# Patient Record
Sex: MALE | URBAN - METROPOLITAN AREA
[De-identification: names, ages, dates, MRNs, and addresses within clinical notes are randomized per-mention and may not be internally consistent; named-entity substitution may affect disease eponyms.]

---

## 2018-08-23 ENCOUNTER — APPOINTMENT (OUTPATIENT)
Dept: LAB | Age: 23
End: 2018-08-23
Attending: NURSE PRACTITIONER
Payer: COMMERCIAL

## 2018-08-23 ENCOUNTER — OFFICE VISIT (OUTPATIENT)
Dept: FAMILY MEDICINE CLINIC | Facility: CLINIC | Age: 23
End: 2018-08-23
Payer: COMMERCIAL

## 2018-08-23 VITALS
OXYGEN SATURATION: 98 % | HEART RATE: 92 BPM | RESPIRATION RATE: 20 BRPM | BODY MASS INDEX: 37.57 KG/M2 | TEMPERATURE: 99 F | HEIGHT: 72 IN | DIASTOLIC BLOOD PRESSURE: 80 MMHG | SYSTOLIC BLOOD PRESSURE: 138 MMHG | WEIGHT: 277.38 LBS

## 2018-08-23 DIAGNOSIS — K29.00 ACUTE GASTRITIS, PRESENCE OF BLEEDING UNSPECIFIED, UNSPECIFIED GASTRITIS TYPE: Primary | ICD-10-CM

## 2018-08-23 DIAGNOSIS — F41.9 ANXIETY: ICD-10-CM

## 2018-08-23 PROCEDURE — 83013 H PYLORI (C-13) BREATH: CPT | Performed by: NURSE PRACTITIONER

## 2018-08-23 PROCEDURE — 99215 OFFICE O/P EST HI 40 MIN: CPT | Performed by: NURSE PRACTITIONER

## 2018-08-23 RX ORDER — ALBUTEROL SULFATE 90 UG/1
1-2 AEROSOL, METERED RESPIRATORY (INHALATION)
COMMUNITY
Start: 2016-07-05 | End: 2019-05-23

## 2018-08-23 RX ORDER — SERTRALINE HYDROCHLORIDE 100 MG/1
100 TABLET, FILM COATED ORAL DAILY
Qty: 30 TABLET | Refills: 3 | Status: SHIPPED | OUTPATIENT
Start: 2018-08-23 | End: 2019-05-23

## 2018-08-23 RX ORDER — LORAZEPAM 0.5 MG/1
0.5 TABLET ORAL EVERY 6 HOURS PRN
Refills: 0 | COMMUNITY
Start: 2018-07-25 | End: 2020-01-16

## 2018-08-23 RX ORDER — SERTRALINE HYDROCHLORIDE 25 MG/1
25 TABLET, FILM COATED ORAL DAILY
Qty: 30 TABLET | Refills: 3 | Status: SHIPPED | OUTPATIENT
Start: 2018-08-23 | End: 2019-05-23

## 2018-08-23 NOTE — PROGRESS NOTES
Mariana Velasquez is a 26year old male. Patient presents with:  Back Pain: upper back  Shoulder Pain: left side  Heartburn      HPI:   Complaints of pain to left trapezius area - for a while.    Heart burn has gotten worse- used to just be with certain food on file. Social History:  Smoking status: Never Smoker                                                              Smokeless tobacco: Never Used                      Alcohol use: Yes              Comment: 2 per week- beer    No family history on file. diagnosis)  Anxiety      Orders Placed This Encounter      H PYLORI BREATH TEST [19668][Q]    Meds & Refills for this Visit:    Signed Prescriptions Disp Refills    Sertraline HCl 100 MG Oral Tab 30 tablet 3      Sig: Take 1 tablet (100 mg total) by mouth

## 2018-08-25 ENCOUNTER — TELEPHONE (OUTPATIENT)
Dept: FAMILY MEDICINE CLINIC | Facility: CLINIC | Age: 23
End: 2018-08-25

## 2018-08-25 LAB — H. PYLORI BREATH TEST: NEGATIVE

## 2018-08-25 NOTE — TELEPHONE ENCOUNTER
----- Message from AGUILA Lomeli sent at 8/25/2018  8:05 AM CDT -----  Please notify patient that his H. pylori test is negative. I would recommend that he take Nexium or Prilosec over-the-counter once a day for 2-4 weeks.   Also dietary changes as

## 2018-09-19 ENCOUNTER — HOSPITAL ENCOUNTER (EMERGENCY)
Facility: CLINIC | Age: 23
Discharge: HOME | End: 2018-09-20
Attending: EMERGENCY MEDICINE | Admitting: EMERGENCY MEDICINE
Payer: COMMERCIAL

## 2018-09-19 DIAGNOSIS — F41.9 ANXIETY: ICD-10-CM

## 2018-09-19 DIAGNOSIS — F12.90 MARIJUANA USE: ICD-10-CM

## 2018-09-19 LAB
ANION GAP SERPL CALCULATED.3IONS-SCNC: 12 MMOL/L (ref 3–14)
BASOPHILS # BLD AUTO: 0 10E9/L (ref 0–0.2)
BASOPHILS NFR BLD AUTO: 0.4 %
BUN SERPL-MCNC: 11 MG/DL (ref 7–30)
CA-I BLD-SCNC: 5.1 MG/DL (ref 4.4–5.2)
CALCIUM SERPL-MCNC: 9.6 MG/DL (ref 8.5–10.1)
CHLORIDE SERPL-SCNC: 101 MMOL/L (ref 94–109)
CO2 BLDCOV-SCNC: 23 MMOL/L (ref 21–28)
CO2 BLDCOV-SCNC: 23 MMOL/L (ref 21–28)
CO2 SERPL-SCNC: 22 MMOL/L (ref 20–32)
CREAT SERPL-MCNC: 1.04 MG/DL (ref 0.66–1.25)
DIFFERENTIAL METHOD BLD: NORMAL
EOSINOPHIL # BLD AUTO: 0.2 10E9/L (ref 0–0.7)
EOSINOPHIL NFR BLD AUTO: 1.6 %
ERYTHROCYTE [DISTWIDTH] IN BLOOD BY AUTOMATED COUNT: 13 % (ref 10–15)
GFR SERPL CREATININE-BSD FRML MDRD: 90 ML/MIN/1.7M2
GLUCOSE BLD-MCNC: 160 MG/DL (ref 70–99)
GLUCOSE SERPL-MCNC: 154 MG/DL (ref 70–99)
HCT VFR BLD AUTO: 43.3 % (ref 40–53)
HCT VFR BLD CALC: 43 %PCV (ref 40–53)
HGB BLD CALC-MCNC: 14.6 G/DL (ref 13.3–17.7)
HGB BLD-MCNC: 14.8 G/DL (ref 13.3–17.7)
IMM GRANULOCYTES # BLD: 0 10E9/L (ref 0–0.4)
IMM GRANULOCYTES NFR BLD: 0.2 %
LACTATE BLD-SCNC: 2.7 MMOL/L (ref 0.7–2)
LACTATE BLD-SCNC: 2.7 MMOL/L (ref 0.7–2.1)
LYMPHOCYTES # BLD AUTO: 3.9 10E9/L (ref 0.8–5.3)
LYMPHOCYTES NFR BLD AUTO: 40.3 %
MCH RBC QN AUTO: 29.4 PG (ref 26.5–33)
MCHC RBC AUTO-ENTMCNC: 34.2 G/DL (ref 31.5–36.5)
MCV RBC AUTO: 86 FL (ref 78–100)
MONOCYTES # BLD AUTO: 0.6 10E9/L (ref 0–1.3)
MONOCYTES NFR BLD AUTO: 6.2 %
NEUTROPHILS # BLD AUTO: 4.9 10E9/L (ref 1.6–8.3)
NEUTROPHILS NFR BLD AUTO: 51.3 %
NRBC # BLD AUTO: 0 10*3/UL
NRBC BLD AUTO-RTO: 0 /100
PCO2 BLDV: 33 MM HG (ref 40–50)
PCO2 BLDV: 34 MM HG (ref 40–50)
PH BLDV: 7.44 PH (ref 7.32–7.43)
PH BLDV: 7.45 PH (ref 7.32–7.43)
PLATELET # BLD AUTO: 288 10E9/L (ref 150–450)
PO2 BLDV: 34 MM HG (ref 25–47)
PO2 BLDV: 40 MM HG (ref 25–47)
POTASSIUM BLD-SCNC: 2.9 MMOL/L (ref 3.4–5.3)
POTASSIUM SERPL-SCNC: 2.9 MMOL/L (ref 3.4–5.3)
RBC # BLD AUTO: 5.04 10E12/L (ref 4.4–5.9)
SAO2 % BLDV FROM PO2: 69 %
SAO2 % BLDV FROM PO2: 78 %
SODIUM BLD-SCNC: 138 MMOL/L (ref 133–144)
SODIUM SERPL-SCNC: 136 MMOL/L (ref 133–144)
TROPONIN I SERPL-MCNC: <0.015 UG/L (ref 0–0.04)
WBC # BLD AUTO: 9.6 10E9/L (ref 4–11)

## 2018-09-19 PROCEDURE — 85025 COMPLETE CBC W/AUTO DIFF WBC: CPT | Performed by: EMERGENCY MEDICINE

## 2018-09-19 PROCEDURE — 40000502 ZZHCL STATISTIC GLUCOSE ED POCT

## 2018-09-19 PROCEDURE — 99285 EMERGENCY DEPT VISIT HI MDM: CPT

## 2018-09-19 PROCEDURE — 82330 ASSAY OF CALCIUM: CPT

## 2018-09-19 PROCEDURE — 93010 ELECTROCARDIOGRAM REPORT: CPT | Mod: Z6 | Performed by: EMERGENCY MEDICINE

## 2018-09-19 PROCEDURE — 83605 ASSAY OF LACTIC ACID: CPT | Mod: 91

## 2018-09-19 PROCEDURE — 93005 ELECTROCARDIOGRAM TRACING: CPT

## 2018-09-19 PROCEDURE — 83605 ASSAY OF LACTIC ACID: CPT | Performed by: EMERGENCY MEDICINE

## 2018-09-19 PROCEDURE — 40000497 ZZHCL STATISTIC SODIUM ED POCT

## 2018-09-19 PROCEDURE — 40000501 ZZHCL STATISTIC HEMATOCRIT ED POCT

## 2018-09-19 PROCEDURE — 99285 EMERGENCY DEPT VISIT HI MDM: CPT | Mod: 25 | Performed by: EMERGENCY MEDICINE

## 2018-09-19 PROCEDURE — 80048 BASIC METABOLIC PNL TOTAL CA: CPT | Performed by: EMERGENCY MEDICINE

## 2018-09-19 PROCEDURE — 25000132 ZZH RX MED GY IP 250 OP 250 PS 637: Performed by: EMERGENCY MEDICINE

## 2018-09-19 PROCEDURE — 84484 ASSAY OF TROPONIN QUANT: CPT | Performed by: EMERGENCY MEDICINE

## 2018-09-19 PROCEDURE — 96374 THER/PROPH/DIAG INJ IV PUSH: CPT

## 2018-09-19 PROCEDURE — 25000128 H RX IP 250 OP 636: Performed by: EMERGENCY MEDICINE

## 2018-09-19 PROCEDURE — 40000498 ZZHCL STATISTIC POTASSIUM ED POCT

## 2018-09-19 PROCEDURE — 82803 BLOOD GASES ANY COMBINATION: CPT

## 2018-09-19 RX ORDER — LORAZEPAM 2 MG/ML
1 INJECTION INTRAMUSCULAR ONCE
Status: COMPLETED | OUTPATIENT
Start: 2018-09-19 | End: 2018-09-20

## 2018-09-19 RX ORDER — POTASSIUM CHLORIDE 750 MG/1
40 TABLET, EXTENDED RELEASE ORAL ONCE
Status: COMPLETED | OUTPATIENT
Start: 2018-09-19 | End: 2018-09-19

## 2018-09-19 RX ADMIN — POTASSIUM CHLORIDE 40 MEQ: 750 TABLET, EXTENDED RELEASE ORAL at 23:47

## 2018-09-19 RX ADMIN — SODIUM CHLORIDE 1000 ML: 900 INJECTION, SOLUTION INTRAVENOUS at 23:47

## 2018-09-19 NOTE — ED AVS SNAPSHOT
[facility], [place], Emergency Department    [address] [place] [address]    Phone:  [phone]                                       Blaine Soto   MRN: [id number]    Department:  [facility], Emergency Department   Date of Visit:  9/19/2018           After Visit Summary Signature Page     I have received my discharge instructions, and my questions have been answered. I have discussed any challenges I see with this plan with the nurse or doctor.    ..........................................................................................................................................  Patient/Patient Representative Signature      ..........................................................................................................................................  Patient Representative Print Name and Relationship to Patient    ..................................................               ................................................  Date                                   Time    ..........................................................................................................................................  Reviewed by Signature/Title    ...................................................              ..............................................  Date                                               Time          22EPIC Rev 08/18

## 2018-09-19 NOTE — ED AVS SNAPSHOT
[facility], Emergency Department    [address] [facility] [place] [address]    Phone:  [phone]                                       Blaine Soto   MRN: [id number]    Department:  [facility], Emergency Department   Date of Visit:  9/19/2018           Patient Information     Date Of Birth          1995        Your diagnoses for this visit were:     Marijuana use     Anxiety        You were seen by Mikel Freeman MD.        Discharge Instructions       Please make an appointment to follow up with Your Primary Care Provider as soon as possible.      24 Hour Appointment Hotline       To make an appointment at any [facility], call [phone] ([phone]). If you don't have a family doctor or clinic, we will help you find one. [facility] clinics are conveniently located to serve the needs of you and your family.             Review of your medicines      Our records show that you are taking the medicines listed below. If these are incorrect, please call your family doctor or clinic.        Dose / Directions Last dose taken    LORAZEPAM PO   Dose:  0.5 mg        Take 0.5 mg by mouth   Refills:  0        ZOLOFT PO   Dose:  125 mg        Take 125 mg by mouth daily   Refills:  0                Procedures and tests performed during your visit     Basic metabolic panel    CBC with platelets differential    EKG 12 lead    ISTAT gases elec ica gluc quinton POCT    ISTAT gases lactate quinton POCT    Lactic acid whole blood    Troponin I      Orders Needing Specimen Collection     None      Pending Results     No orders found for last 3 day(s).            Pending Culture Results     No orders found for last 3 day(s).            Pending Results Instructions     If you had any lab results that were not finalized at the time of your Discharge, you can call the ED Lab Result RN at [phone]. You will be contacted by this team for any positive Lab results or changes in treatment. The nurses are available  "7 days a week from 10A to 6:30P.  You can leave a message 24 hours per day and they will return your call.        Thank you for choosing [facility]       Thank you for choosing [facility] for your care. Our goal is always to provide you with excellent care. Hearing back from our patients is one way we can continue to improve our services. Please take a few minutes to complete the written survey that you may receive in the mail after you visit with us. Thank you!        [employer] Information     MST lets you send messages to your doctor, view your test results, renew your prescriptions, schedule appointments and more. To sign up, go to [email or web address] . Click on \"Log in\" on the left side of the screen, which will take you to the Welcome page. Then click on \"Sign up Now\" on the right side of the page.     You will be asked to enter the access code listed below, as well as some personal information. Please follow the directions to create your username and password.     Your access code is: [email or web address]  Expires: 2018 12:57 AM     Your access code will  in 90 days. If you need help or a new code, please call your [facility] clinic or [phone].        Care EveryWhere ID     This is your Care EveryWhere ID. This could be used by other organizations to access your [facility] medical records  [id number]        Equal Access to Services     SARA COWART AH: Hadeitan Lucero, waaxda eri, qaybta kaalenrico thomas . So [facility] [phone].    ATENCIÓN: Si habla español, tiene a busby disposición servicios gratuitos de asistencia lingüística. Andrez al [phone].    We comply with applicable federal civil rights laws and [place] laws. We do not discriminate on the basis of race, color, national origin, age, disability, sex, sexual orientation, or gender identity.            After Visit Summary       This is your record. Keep this with you and show to " your community pharmacist(s) and doctor(s) at your next visit.

## 2018-09-20 VITALS
HEIGHT: 72 IN | DIASTOLIC BLOOD PRESSURE: 67 MMHG | OXYGEN SATURATION: 93 % | RESPIRATION RATE: 19 BRPM | SYSTOLIC BLOOD PRESSURE: 112 MMHG | HEART RATE: 168 BPM | TEMPERATURE: 98.8 F

## 2018-09-20 LAB — INTERPRETATION ECG - MUSE: NORMAL

## 2018-09-20 PROCEDURE — 25000128 H RX IP 250 OP 636: Performed by: EMERGENCY MEDICINE

## 2018-09-20 PROCEDURE — 96374 THER/PROPH/DIAG INJ IV PUSH: CPT

## 2018-09-20 RX ADMIN — LORAZEPAM 1 MG: 2 INJECTION INTRAMUSCULAR; INTRAVENOUS at 00:09

## 2018-09-20 NOTE — ED PROVIDER NOTES
History     Chief Complaint   Patient presents with     Anxiety     Tachycardia     HPI  Blaine Soto is a 23 year old male who presents to emergency department with complaints of racing heart and panic attack.  He states he was smoking marijuana today and shortly after he started developing this wheezing heart and panicky feeling.  He states that he does have a long history of anxiety and does take medication for it.  He denies all other issues at this time.    I have reviewed the Medications, Allergies, Past Medical and Surgical History, and Social History in the Epic system.    Review of Systems   All other systems reviewed and are negative.      Physical Exam   BP: 139/89  Pulse: 168  Heart Rate: 142  Temp: 98.8  F (37.1  C)  Resp: 18  Height: 182.9 cm (6')  SpO2: 100 %      Physical Exam   Constitutional: He is oriented to person, place, and time. He appears well-developed and well-nourished. No distress.   HENT:   Head: Normocephalic and atraumatic.   Eyes: No scleral icterus.   Neck: Normal range of motion. Neck supple.   Cardiovascular: Tachycardia present.    Pulmonary/Chest: Effort normal. No respiratory distress.   Abdominal: Soft. There is no tenderness.   Neurological: He is alert and oriented to person, place, and time.   Skin: Skin is warm and dry. No rash noted. He is not diaphoretic. No erythema. No pallor.       ED Course     ED Course     Procedures             EKG Interpretation:      Interpreted by Mikel Freeman  Time reviewed: 2209  Symptoms at time of EKG: palpatations   Rhythm: sinus tach   Rate: tachy  Axis: normal  Ectopy: none  Conduction: normal  ST Segments/ T Waves: No ST-T wave changes  Q Waves: none  Comparison to prior: No old EKG available    Clinical Impression: sinus tach        Critical Care time:  none     The Lactic acid level is elevated due to smoking marijuana, at this time there is no sign of severe sepsis or septic shock.       Labs Ordered and Resulted from  Time of ED Arrival Up to the Time of Departure from the ED   BASIC METABOLIC PANEL - Abnormal; Notable for the following:        Result Value    Potassium 2.9 (*)     Glucose 154 (*)     All other components within normal limits   LACTIC ACID WHOLE BLOOD - Abnormal; Notable for the following:     Lactic Acid 2.7 (*)     All other components within normal limits   ISTAT  GASES LACTATE ENE POCT - Abnormal; Notable for the following:     Ph Venous 7.45 (*)     PCO2 Venous 34 (*)     Lactic Acid 2.7 (*)     All other components within normal limits   ISTAT GASES ELEC ICA GLUC ENE POCT - Abnormal; Notable for the following:     Ph Venous 7.44 (*)     PCO2 Venous 33 (*)     Potassium 2.9 (*)     Glucose 160 (*)     All other components within normal limits   CBC WITH PLATELETS DIFFERENTIAL   TROPONIN I            Assessments & Plan (with Medical Decision Making)   This is a 23-year-old male coming into emergency room with tachycardia and anxiety after smoking marijuana.  Is found to initially have a heart rate approximately 168.  He was provided with IV fluids and Ativan with significant improvement of his symptoms.  His heart rate at the time of reevaluation is approximately 110-120.  He states he feels symptomatically better.  All of his labs show that he has a mild hypokalemia but this is most likely associated with tachycardia.  Patient is advised that he should probably not smoke marijuana considering his history of anxiety.  At this time he can follow-up with his primary care doctor for further care management.    I have reviewed the nursing notes.    I have reviewed the findings, diagnosis, plan and need for follow up with the patient.    New Prescriptions    No medications on file       Final diagnoses:   Marijuana use   Anxiety       9/19/2018   [facility], [place], EMERGENCY DEPARTMENT     Mikel Freeman MD  09/20/18 0122

## 2019-01-14 NOTE — PROGRESS NOTES
Clayton Haskins is a 26year old male. Patient presents with:  Medication Follow-Up: requesting to change medications      HPI:   Patient presents today for recheck of his anxiety.   States he has been on sertraline for several years–states it did help toby Sertraline HCl 100 MG Oral Tab Take 1 tablet (100 mg total) by mouth daily. Disp: 30 tablet Rfl: 3   Sertraline HCl 25 MG Oral Tab Take 1 tablet (25 mg total) by mouth daily. Disp: 30 tablet Rfl: 3      History reviewed.  No pertinent past medical history Prescriptions Disp Refills   • Venlafaxine HCl ER (EFFEXOR XR) 150 MG Oral Capsule SR 24 Hr 90 capsule 0     Sig: Take 1 capsule (150 mg total) by mouth daily. Imaging & Consults:  None    No Follow-up on file.   Patient Instructions   Stop zoloft  An

## 2019-01-14 NOTE — PATIENT INSTRUCTIONS
Stop zoloft  And start Effexor tomorrow-  Take once a day -     Restart counseling for panic attack. It is important to get enough sleep (at least 7 hrs a night).   Increase EXERCISE, eat a healthy diet (5 fruits and/or vegetables a day), stay hydrated,

## 2019-01-16 ENCOUNTER — TELEPHONE (OUTPATIENT)
Dept: FAMILY MEDICINE CLINIC | Facility: CLINIC | Age: 24
End: 2019-01-16

## 2019-01-16 NOTE — TELEPHONE ENCOUNTER
Was in recently and prescribed capsules. Having a hard time swallowing them. Pharmacist said there are tablets. Please call back.

## 2019-01-16 NOTE — TELEPHONE ENCOUNTER
According to the pharmacy the tabs are not covered. We can try prior auth but still not guarantee. Advised to watch U-tube video no how to swallow caps. Pt will call back if unsuccessful.

## 2019-04-09 ENCOUNTER — TELEPHONE (OUTPATIENT)
Dept: FAMILY MEDICINE CLINIC | Facility: CLINIC | Age: 24
End: 2019-04-09

## 2019-04-09 RX ORDER — VENLAFAXINE HYDROCHLORIDE 150 MG/1
150 CAPSULE, EXTENDED RELEASE ORAL DAILY
Qty: 90 CAPSULE | Refills: 0 | Status: SHIPPED | OUTPATIENT
Start: 2019-04-09 | End: 2019-07-06

## 2019-04-09 NOTE — TELEPHONE ENCOUNTER
Follow up in 1-3 months or sooner  If concerns. Pt is currently away at school. Could not find [facility] [address]     Pt would like script called in [phone].      Pt will schedule appt when he returns from [facility]

## 2019-04-11 RX ORDER — VENLAFAXINE HYDROCHLORIDE 150 MG/1
CAPSULE, EXTENDED RELEASE ORAL
Qty: 90 CAPSULE | Refills: 0 | OUTPATIENT
Start: 2019-04-11

## 2019-05-23 ENCOUNTER — OFFICE VISIT (OUTPATIENT)
Dept: FAMILY MEDICINE CLINIC | Facility: CLINIC | Age: 24
End: 2019-05-23
Payer: COMMERCIAL

## 2019-05-23 VITALS
WEIGHT: 286.81 LBS | HEIGHT: 72 IN | RESPIRATION RATE: 18 BRPM | BODY MASS INDEX: 38.85 KG/M2 | SYSTOLIC BLOOD PRESSURE: 130 MMHG | DIASTOLIC BLOOD PRESSURE: 80 MMHG | HEART RATE: 133 BPM | OXYGEN SATURATION: 97 % | TEMPERATURE: 99 F

## 2019-05-23 DIAGNOSIS — F41.9 ANXIETY: ICD-10-CM

## 2019-05-23 DIAGNOSIS — J40 BRONCHITIS: Primary | ICD-10-CM

## 2019-05-23 PROCEDURE — 99214 OFFICE O/P EST MOD 30 MIN: CPT | Performed by: NURSE PRACTITIONER

## 2019-05-23 RX ORDER — ALBUTEROL SULFATE 90 UG/1
1-2 AEROSOL, METERED RESPIRATORY (INHALATION)
Qty: 1 INHALER | Refills: 0 | Status: SHIPPED | OUTPATIENT
Start: 2019-05-23 | End: 2020-01-16

## 2019-05-23 RX ORDER — AZITHROMYCIN 250 MG/1
TABLET, FILM COATED ORAL
Qty: 6 TABLET | Refills: 0 | Status: SHIPPED | OUTPATIENT
Start: 2019-05-23 | End: 2020-01-16

## 2019-05-23 NOTE — PROGRESS NOTES
Leo Butcher is a 26year old male. Patient presents with:  Cough  Medication Follow-Up      HPI:   Complaints of panic attacks down to just once a month -patient states that he is feeling a lot better on the venlafaxine–only side effect is sweating. Comment:Pt was told to not take by parents, no reactions             given  Sulfa Antibiotics       OTHER (SEE COMMENTS)    Comment:Pt was told to not take by parents, no reactions             given    REVIEW OF SYSTEMS:   GENERAL HEALTH: feels well otherw Consults:  None    No follow-ups on file.   Patient Instructions   Start Pulmicort 2 puffs twice a day - rinse mouth with mouthwash     Use albuterol inhaler every 4-6 hrs as needed for cough     Continue Effexor -  Call pharmacy when RF's are needed     It

## 2019-05-23 NOTE — PATIENT INSTRUCTIONS
Start Pulmicort 2 puffs twice a day - rinse mouth with mouthwash     Use albuterol inhaler every 4-6 hrs as needed for cough     Continue Effexor -  Call pharmacy when RF's are needed     It is important to get enough sleep (at least 7 hrs a night).   Incre

## 2019-07-08 RX ORDER — VENLAFAXINE HYDROCHLORIDE 150 MG/1
CAPSULE, EXTENDED RELEASE ORAL
Qty: 90 CAPSULE | Refills: 0 | Status: SHIPPED | OUTPATIENT
Start: 2019-07-08 | End: 2019-10-04

## 2019-10-04 RX ORDER — VENLAFAXINE HYDROCHLORIDE 150 MG/1
CAPSULE, EXTENDED RELEASE ORAL
Qty: 90 CAPSULE | Refills: 0 | Status: SHIPPED | OUTPATIENT
Start: 2019-10-04 | End: 2020-01-02

## 2019-10-04 NOTE — TELEPHONE ENCOUNTER
Future appt:    Last Appointment with provider:   5/23/2019; patient was instructed at that time to call the pharmacy when RFs are needed on Effexor.     Last appointment at [facility] [place]:  5/23/2019  No results found for: CHOLEST, HDL, LDL, TRIGLY, TRIG  No

## 2020-01-02 RX ORDER — VENLAFAXINE HYDROCHLORIDE 150 MG/1
150 CAPSULE, EXTENDED RELEASE ORAL
Qty: 30 CAPSULE | Refills: 0 | Status: SHIPPED | OUTPATIENT
Start: 2020-01-02 | End: 2020-01-16

## 2020-01-02 NOTE — TELEPHONE ENCOUNTER
Future appt:    Last Appointment with provider:   Visit date not found  Last appointment at [facility]:  Visit date not found  No results found for: CHOLEST, HDL, LDL, TRIGLY, TRIG  No results found for: EAG, A1C  No results found for: T4F, TSH, TSHT4

## 2020-01-03 RX ORDER — VENLAFAXINE HYDROCHLORIDE 150 MG/1
CAPSULE, EXTENDED RELEASE ORAL
Qty: 90 CAPSULE | Refills: 0 | OUTPATIENT
Start: 2020-01-03

## 2020-01-03 NOTE — TELEPHONE ENCOUNTER
Chart reviewed     Last seen by Rayshawn Hurtado for anxiety - started medication. [id number] [address] for one month refill - needs appointment for follow up. Patient was advised f/u in 1  - 3 months from 1/14/2019.

## 2020-01-07 NOTE — TELEPHONE ENCOUNTER
Patient informed of refill and appointment needed before next refill will be given as per Dr. Rikki Ontiveros. Patient states that he will have to call back to schedule the appointment.

## 2020-01-16 NOTE — PROGRESS NOTES
Deann Casey is a 27year old male. Patient presents with: Anxiety      HPI:   Patient presents returns today for recheck of his depression anxiety.   Patient states he has been doing well on the Effexor patient states that he is not really having padma given  Sulfa Antibiotics       OTHER (SEE COMMENTS)    Comment:Pt was told to not take by parents, no reactions             given    REVIEW OF SYSTEMS:   GENERAL HEALTH: feels well otherwise  HEENT: denies complaints  SKIN: denies any unusual skin lesi excessive caffeine, avoid alcohol, cigarettes, and street drugs. Follow up with Dr. David Santillan or Emre [facility] NP  For sleep evaluation - tack sleep with fit bit in the mean time     Work on weight loss.      I spent a total of 25 minutes face-to-face with

## 2020-01-16 NOTE — PATIENT INSTRUCTIONS
Check to see if you had your 3rd Gardasil HPV vaccine- if not - check with insurance. It is important to get enough sleep (at least 7 hrs a night).   Increase EXERCISE, eat a healthy diet (5 fruits and/or vegetables a day), stay hydrated,  take a multiv

## 2020-02-11 ENCOUNTER — OFFICE VISIT (OUTPATIENT)
Dept: FAMILY MEDICINE CLINIC | Facility: CLINIC | Age: 25
End: 2020-02-11
Payer: COMMERCIAL

## 2020-02-11 VITALS
OXYGEN SATURATION: 99 % | DIASTOLIC BLOOD PRESSURE: 80 MMHG | HEIGHT: 72 IN | RESPIRATION RATE: 18 BRPM | WEIGHT: 305 LBS | SYSTOLIC BLOOD PRESSURE: 100 MMHG | HEART RATE: 144 BPM | TEMPERATURE: 101 F | BODY MASS INDEX: 41.31 KG/M2

## 2020-02-11 DIAGNOSIS — J11.1 INFLUENZA: Primary | ICD-10-CM

## 2020-02-11 DIAGNOSIS — J45.20 MILD INTERMITTENT ASTHMA, UNSPECIFIED WHETHER COMPLICATED: ICD-10-CM

## 2020-02-11 LAB
FLUAV + FLUBV RNA SPEC NAA+PROBE: NEGATIVE
FLUAV + FLUBV RNA SPEC NAA+PROBE: NEGATIVE
FLUAV + FLUBV RNA SPEC NAA+PROBE: POSITIVE

## 2020-02-11 PROCEDURE — 87502 INFLUENZA DNA AMP PROBE: CPT | Performed by: NURSE PRACTITIONER

## 2020-02-11 PROCEDURE — 99214 OFFICE O/P EST MOD 30 MIN: CPT | Performed by: NURSE PRACTITIONER

## 2020-02-11 PROCEDURE — 87798 DETECT AGENT NOS DNA AMP: CPT | Performed by: NURSE PRACTITIONER

## 2020-02-11 RX ORDER — OSELTAMIVIR PHOSPHATE 75 MG/1
75 CAPSULE ORAL 2 TIMES DAILY
Qty: 10 CAPSULE | Refills: 0 | Status: SHIPPED | OUTPATIENT
Start: 2020-02-11 | End: 2020-02-16

## 2020-02-11 NOTE — PATIENT INSTRUCTIONS
Influenza is a virus and you will feel sick for about one week. Tamiflu is an antiviral medication that may decrease how long you are sick by a few days. You should stay home until you are fever free for 24hrs.    Explained that influenza is contagious f

## 2020-02-11 NOTE — PROGRESS NOTES
CHIEF COMPLAINT:   Patient presents with:  Cough  Headache  Chest Congestion      HPI:   Bryant De Los Santos is a 27year old male who presents to clinic today with complaints of feeling ill - headache and ear pain- lungs feel tight- inhaler helped-   Headac Posterior pharynx no erythematous or injected. No exudates. NECK: supple, non-tender  THYROID: Normal size, no nodules  LUNGS: Occasional, harsh, tight, bronchial cough–lungs are otherwise clear to auscultation bilaterally, no wheezes or rhonchi.  Breathin

## 2020-02-12 ENCOUNTER — TELEPHONE (OUTPATIENT)
Dept: FAMILY MEDICINE CLINIC | Facility: CLINIC | Age: 25
End: 2020-02-12

## 2020-02-12 NOTE — TELEPHONE ENCOUNTER
----- Message from FARHAD Rojas sent at 2/12/2020  8:19 AM CST -----  His notify patient that his test came back positive for influenza A patient should finish Tamiflu follow-up if any signs of secondary infection.

## 2020-07-18 NOTE — PATIENT INSTRUCTIONS
Check with your insurance company if you need a referral to your counselor with your insurance type. Be sure he is in network prior to going to appointment. Increase your venlafaxine, take an additional tablet 37.5mg with your 150mg tablet daily.   Refil

## 2020-07-18 NOTE — PROGRESS NOTES
[address]  PROGRESS NOTE  Chief Complaint:   Patient presents with: Anxiety/Panic attack: 2 panic attacks this passed week. Discuss dose increase      HPI:   This is a 23year old male coming in for anxiety, bowel changes.      Woke up past medical history. History reviewed. No pertinent surgical history. Social History:  Social History    Tobacco Use      Smoking status: Never Smoker      Smokeless tobacco: Never Used    Alcohol use: Yes      Comment: 2 per week- beer    Drug use:  No nose or sore throat. INTEGUMENTARY:  Denies rashes, itching, skin lesion, or excessive skin dryness.   CARDIOVASCULAR:  Denies chest pain, chest pressure, chest discomfort, palpitations, edema, dyspnea on exertion or at rest.  RESPIRATORY:  Denies shortnes No rashes, no skin lesion, no bruising, good turgor. HEART:  Regular rate and rhythm, no murmurs, rubs or gallops. Heart rate mildly elevated. LUNGS: Clear to auscultation bilterally, no rales/rhonchi/wheezing. CHEST: No tenderness.   ABDOMEN:  Soft, no foods. Labs today. - CBC WITH DIFFERENTIAL WITH PLATELET; Future      Patient/Caregiver Education: Patient/Caregiver Education: There are no barriers to learning. Medical education done. Outcome: Patient verbalizes understanding.  Patient is notified 12/15/2011      Problem List:  Patient Active Problem List:     Asthma     Dysthymic disorder     Iron deficiency anemia      Franco Leyva, FARHAD  7/18/2020  11:42 AM    This note was created utilizing Dragon speech recognition software.  Please excuse an

## 2020-07-28 ENCOUNTER — TELEPHONE (OUTPATIENT)
Dept: FAMILY MEDICINE CLINIC | Facility: CLINIC | Age: 25
End: 2020-07-28

## 2020-07-29 NOTE — TELEPHONE ENCOUNTER
Labs drawn 07/18/20 without results available. Please contact lab, if labs not available then will need patient redrawn though unsure if he has moved to [facility] yet as he had anticipated doing.      Yelena Goes, would this be a charge to the patient if he needs

## 2020-07-29 NOTE — TELEPHONE ENCOUNTER
Spoke with [facility]. Was informed they never received the specimens for patient's lab work ordered on 7/18/2020. Was informed that they did not receive our packing list from this day. Please advise.      Debbie notified and will be looking into this as

## 2020-07-29 NOTE — TELEPHONE ENCOUNTER
Spoke with patient. He will be back in the area this weekend. Asked to be redrawn on Monday, 8/3/2020. Appt scheduled.      Future Appointments   Date Time Provider Jamel Byrd   8/3/2020 11:15 AM REF [place] REF [facility]

## 2020-08-12 RX ORDER — VENLAFAXINE HYDROCHLORIDE 37.5 MG/1
CAPSULE, EXTENDED RELEASE ORAL
Qty: 30 CAPSULE | Refills: 0 | Status: SHIPPED | OUTPATIENT
Start: 2020-08-12 | End: 2020-08-15

## 2020-08-12 NOTE — TELEPHONE ENCOUNTER
----- Message from Wilfrid Gore sent at 8/12/2020 10:05 AM CDT -----  [facility] -     Your Appointments    Saturday August 15, 2020  9:00 AM CDT  Follow Up Visit with Haskell Homans, [address] [facility]   [facility]

## 2020-08-12 NOTE — TELEPHONE ENCOUNTER
Future appt:    Last Appointment with provider:   7/18/2020  Last appointment at [facility]:  7/18/2020  No results found for: CHOLEST, HDL, LDL, TRIGLY, TRIG  No results found for: EAG, A1C  No results found for: T4F, TSH, TSHT4    Last RF:  7/18/2020

## 2020-08-15 ENCOUNTER — LAB ENCOUNTER (OUTPATIENT)
Dept: LAB | Age: 25
End: 2020-08-15
Attending: FAMILY MEDICINE
Payer: COMMERCIAL

## 2020-08-15 DIAGNOSIS — F34.1 DYSTHYMIC DISORDER: ICD-10-CM

## 2020-08-15 LAB
ALBUMIN SERPL-MCNC: 3.9 G/DL (ref 3.4–5)
ALBUMIN/GLOB SERPL: 0.9 {RATIO} (ref 1–2)
ALP LIVER SERPL-CCNC: 95 U/L (ref 45–117)
ALT SERPL-CCNC: 95 U/L (ref 16–61)
ANION GAP SERPL CALC-SCNC: 5 MMOL/L (ref 0–18)
AST SERPL-CCNC: 34 U/L (ref 15–37)
BASOPHILS # BLD AUTO: 0.06 X10(3) UL (ref 0–0.2)
BASOPHILS NFR BLD AUTO: 0.9 %
BILIRUB SERPL-MCNC: 0.3 MG/DL (ref 0.1–2)
BUN BLD-MCNC: 10 MG/DL (ref 7–18)
BUN/CREAT SERPL: 9.9 (ref 10–20)
CALCIUM BLD-MCNC: 9.7 MG/DL (ref 8.5–10.1)
CHLORIDE SERPL-SCNC: 105 MMOL/L (ref 98–112)
CO2 SERPL-SCNC: 28 MMOL/L (ref 21–32)
CREAT BLD-MCNC: 1.01 MG/DL (ref 0.7–1.3)
DEPRECATED RDW RBC AUTO: 42.2 FL (ref 35.1–46.3)
EOSINOPHIL # BLD AUTO: 0.17 X10(3) UL (ref 0–0.7)
EOSINOPHIL NFR BLD AUTO: 2.7 %
ERYTHROCYTE [DISTWIDTH] IN BLOOD BY AUTOMATED COUNT: 12.8 % (ref 11–15)
GLOBULIN PLAS-MCNC: 4.5 G/DL (ref 2.8–4.4)
GLUCOSE BLD-MCNC: 92 MG/DL (ref 70–99)
HCT VFR BLD AUTO: 43.9 % (ref 39–53)
HGB BLD-MCNC: 13.9 G/DL (ref 13–17.5)
IMM GRANULOCYTES # BLD AUTO: 0.01 X10(3) UL (ref 0–1)
IMM GRANULOCYTES NFR BLD: 0.2 %
LYMPHOCYTES # BLD AUTO: 2.23 X10(3) UL (ref 1–4)
LYMPHOCYTES NFR BLD AUTO: 34.8 %
M PROTEIN MFR SERPL ELPH: 8.4 G/DL (ref 6.4–8.2)
MCH RBC QN AUTO: 28.5 PG (ref 26–34)
MCHC RBC AUTO-ENTMCNC: 31.7 G/DL (ref 31–37)
MCV RBC AUTO: 90.1 FL (ref 80–100)
MONOCYTES # BLD AUTO: 0.62 X10(3) UL (ref 0.1–1)
MONOCYTES NFR BLD AUTO: 9.7 %
NEUTROPHILS # BLD AUTO: 3.32 X10 (3) UL (ref 1.5–7.7)
NEUTROPHILS # BLD AUTO: 3.32 X10(3) UL (ref 1.5–7.7)
NEUTROPHILS NFR BLD AUTO: 51.7 %
OSMOLALITY SERPL CALC.SUM OF ELEC: 285 MOSM/KG (ref 275–295)
PATIENT FASTING Y/N/NP: NO
PLATELET # BLD AUTO: 297 10(3)UL (ref 150–450)
POTASSIUM SERPL-SCNC: 4.3 MMOL/L (ref 3.5–5.1)
RBC # BLD AUTO: 4.87 X10(6)UL (ref 4.3–5.7)
SODIUM SERPL-SCNC: 138 MMOL/L (ref 136–145)
T4 FREE SERPL-MCNC: 1 NG/DL (ref 0.8–1.7)
TSI SER-ACNC: 2.25 MIU/ML (ref 0.36–3.74)
VIT D+METAB SERPL-MCNC: 23.8 NG/ML (ref 30–100)
WBC # BLD AUTO: 6.4 X10(3) UL (ref 4–11)

## 2020-08-15 PROCEDURE — 80050 GENERAL HEALTH PANEL: CPT | Performed by: NURSE PRACTITIONER

## 2020-08-15 PROCEDURE — 36415 COLL VENOUS BLD VENIPUNCTURE: CPT | Performed by: NURSE PRACTITIONER

## 2020-08-15 PROCEDURE — 82306 VITAMIN D 25 HYDROXY: CPT | Performed by: NURSE PRACTITIONER

## 2020-08-15 PROCEDURE — 84439 ASSAY OF FREE THYROXINE: CPT | Performed by: NURSE PRACTITIONER

## 2020-08-15 NOTE — PATIENT INSTRUCTIONS
Labs today. Refill of medication, return in 3 months. Continue medication dose, may take 4-6 weeks for full therapeutic benefit, call with an update if you're going to proceed with Psychiatry consult or if medications change.    Continue counseling kevin

## 2020-08-15 NOTE — PROGRESS NOTES
[address]  PROGRESS NOTE  Chief Complaint:   Patient presents with: Follow - Up      HPI:   This is a 23year old male medication check. Patient has had three weeks on higher venlafaxine dose.   Is still feeling anxious at times, has Onset   • Anxiety Mother      Allergies:    Amoxicillin             OTHER (SEE COMMENTS)    Comment:Pt was told to not take by parents, no reactions             given  Cefaclor                OTHER (SEE COMMENTS)    Comment:Pt was told to not take by Brigido Whittaker Temp 97 °F (36.1 °C) (Temporal)   Resp 22   Ht 72\"   Wt (!) 305 lb (138.3 kg)   SpO2 98%   BMI 41.37 kg/m²  Estimated body mass index is 41.37 kg/m² as calculated from the following:    Height as of this encounter: 72\".     Weight as of this encounter: 7 hours of sleep each night. Notify the office if he does proceed with psychiatry referral or if medication adjustments are made. Return in 3 months or sooner if indicated. -     CBC WITH DIFFERENTIAL WITH PLATELET;  Future  -     COMP METABOLIC PANEL Asthma     Dysthymic disorder     Iron deficiency anemia      Real Lob, APRN    This note was created utilizing Dragon speech recognition software.  Please excuse any grammatical errors. Call my office if you have any questions regarding this note.

## 2020-08-16 PROBLEM — E55.9 VITAMIN D INSUFFICIENCY: Status: ACTIVE | Noted: 2020-08-16

## 2020-08-16 PROBLEM — R74.01 ELEVATED AST (SGOT): Status: ACTIVE | Noted: 2020-08-16

## 2020-09-16 ENCOUNTER — TELEPHONE (OUTPATIENT)
Dept: FAMILY MEDICINE CLINIC | Facility: CLINIC | Age: 25
End: 2020-09-16

## 2020-09-16 DIAGNOSIS — F34.1 DYSTHYMIC DISORDER: ICD-10-CM

## 2020-09-16 RX ORDER — VENLAFAXINE HYDROCHLORIDE 37.5 MG/1
37.5 CAPSULE, EXTENDED RELEASE ORAL DAILY
Qty: 90 CAPSULE | Refills: 0 | OUTPATIENT
Start: 2020-09-16

## 2020-09-16 NOTE — TELEPHONE ENCOUNTER
Called and confirmed with pharmacy that prescription was received. Informed patient that rx is at pharmacy for patient to . No other questions at this time.

## 2020-09-16 NOTE — TELEPHONE ENCOUNTER
Patient provided with 3 month supply of 37.5mg on 08/15/20. Should still have two additional refills at the pharmacy.

## 2020-09-16 NOTE — TELEPHONE ENCOUNTER
[facility]      [facility]     [facility] [address]    call to confirm

## 2020-09-16 NOTE — TELEPHONE ENCOUNTER
Future appt:    Last Appointment with provider:   8/15/2020  Last appointment at [facility]:  8/15/2020  No results found for: CHOLEST, HDL, LDL, TRIGLY, TRIG  No results found for: EAG, A1C  Lab Results   Component Value Date    T4F 1.0 08/15/2020    TS

## 2020-11-06 NOTE — ED TRIAGE NOTES
Pt comes in with c/o heart racing, numbness in his fingers, and panic. Hx of anxiety/panic attacks. States he smoked marijuana today and his symptoms started not long after smoking.   patient is taking meds

## 2020-12-15 DIAGNOSIS — F34.1 DYSTHYMIC DISORDER: ICD-10-CM

## 2020-12-15 NOTE — TELEPHONE ENCOUNTER
Please advise refill of Venlafaxine 37.5mg. Last Rx: 8/15/20    Future appt:    Last Appointment with provider:   8/15/2020 Vanda Judd for Anxiety per notes return in 3 months.     Last appointment at [facility]:  8/15/2020  No results found for: Lang Govea,

## 2020-12-16 RX ORDER — VENLAFAXINE HYDROCHLORIDE 37.5 MG/1
CAPSULE, EXTENDED RELEASE ORAL
Qty: 30 CAPSULE | Refills: 0 | Status: SHIPPED | OUTPATIENT
Start: 2020-12-16 | End: 2020-12-23

## 2020-12-16 NOTE — TELEPHONE ENCOUNTER
Patient informed of the below recommendations. States he will be in town next week. States he will go online and schedule the appt through [address]

## 2020-12-16 NOTE — TELEPHONE ENCOUNTER
----- Message from Merritt Crews sent at 12/16/2020  9:06 AM CST -----  [facility]       [phone]     Merritt Crews, 12/16/20, 9:06 AM

## 2020-12-23 ENCOUNTER — OFFICE VISIT (OUTPATIENT)
Dept: FAMILY MEDICINE CLINIC | Facility: CLINIC | Age: 25
End: 2020-12-23
Payer: COMMERCIAL

## 2020-12-23 ENCOUNTER — LAB ENCOUNTER (OUTPATIENT)
Dept: LAB | Age: 25
End: 2020-12-23
Attending: NURSE PRACTITIONER
Payer: COMMERCIAL

## 2020-12-23 VITALS
WEIGHT: 315 LBS | HEART RATE: 64 BPM | OXYGEN SATURATION: 96 % | DIASTOLIC BLOOD PRESSURE: 80 MMHG | RESPIRATION RATE: 18 BRPM | SYSTOLIC BLOOD PRESSURE: 126 MMHG | TEMPERATURE: 99 F | HEIGHT: 72 IN | BODY MASS INDEX: 42.66 KG/M2

## 2020-12-23 DIAGNOSIS — R74.01 ELEVATED AST (SGOT): ICD-10-CM

## 2020-12-23 DIAGNOSIS — F34.1 DYSTHYMIC DISORDER: ICD-10-CM

## 2020-12-23 DIAGNOSIS — E55.9 VITAMIN D INSUFFICIENCY: ICD-10-CM

## 2020-12-23 PROCEDURE — 82306 VITAMIN D 25 HYDROXY: CPT | Performed by: NURSE PRACTITIONER

## 2020-12-23 PROCEDURE — 3074F SYST BP LT 130 MM HG: CPT | Performed by: NURSE PRACTITIONER

## 2020-12-23 PROCEDURE — 36415 COLL VENOUS BLD VENIPUNCTURE: CPT | Performed by: NURSE PRACTITIONER

## 2020-12-23 PROCEDURE — 3008F BODY MASS INDEX DOCD: CPT | Performed by: NURSE PRACTITIONER

## 2020-12-23 PROCEDURE — 3079F DIAST BP 80-89 MM HG: CPT | Performed by: NURSE PRACTITIONER

## 2020-12-23 PROCEDURE — 80053 COMPREHEN METABOLIC PANEL: CPT | Performed by: NURSE PRACTITIONER

## 2020-12-23 PROCEDURE — 99214 OFFICE O/P EST MOD 30 MIN: CPT | Performed by: NURSE PRACTITIONER

## 2020-12-23 RX ORDER — VENLAFAXINE HYDROCHLORIDE 150 MG/1
150 CAPSULE, EXTENDED RELEASE ORAL
Qty: 90 CAPSULE | Refills: 1 | Status: SHIPPED | OUTPATIENT
Start: 2020-12-23 | End: 2021-05-26

## 2020-12-23 RX ORDER — VENLAFAXINE HYDROCHLORIDE 37.5 MG/1
37.5 CAPSULE, EXTENDED RELEASE ORAL DAILY
Qty: 90 CAPSULE | Refills: 1 | Status: SHIPPED | OUTPATIENT
Start: 2020-12-23 | End: 2021-05-26

## 2020-12-23 NOTE — PATIENT INSTRUCTIONS
Recheck labs today. Continue with venlafaxine 150mg plus 37.5mg daily. Six month refill given. Continue with counseling sessions through [facility]. If concerns of ADHD symptoms, discuss evaluation with [facility].    Would need evaluation from a certified counselo

## 2020-12-23 NOTE — PROGRESS NOTES
[address]  PROGRESS NOTE  Chief Complaint:   Patient presents with: Follow - Up  Anxiety      HPI:   This is a 23year old male anxiety follow up. Has been tolerating venlafaxiine 187.5mg daily. Has been sleeping better.   Is using was told to not take by parents, no reactions             given  Sulfa Antibiotics       OTHER (SEE COMMENTS)    Comment:Pt was told to not take by parents, no reactions             given  Current Meds:  Current Outpatient Medications   Medication Sig Disp from the following:    Height as of this encounter: 6' (1.829 m). Weight as of this encounter: 319 lb (144.7 kg). Vital signs reviewed. Physical Exam:  GEN:  Patient is alert, awake and oriented, well developed, well nourished, no acute distress.   NE patient, over 50% of that time was spent counseling patient regarding treatment options, diet, medication compliance, medication side effects. Patient Instructions   Recheck labs today. Continue with venlafaxine 150mg plus 37.5mg daily.   Six month ref

## 2020-12-24 ENCOUNTER — TELEPHONE (OUTPATIENT)
Dept: FAMILY MEDICINE CLINIC | Facility: CLINIC | Age: 25
End: 2020-12-24

## 2020-12-24 NOTE — TELEPHONE ENCOUNTER
----- Message from FARHAD Bell sent at 12/24/2020  9:33 AM CST -----  Vitamin D level normal range. Sodium level a little low though so ensure having some electrolyte beverage in hydration during the day.   Liver enzymes are still mildly elevat

## 2020-12-24 NOTE — TELEPHONE ENCOUNTER
----- Message from FARHAD Coats sent at 12/24/2020 10:10 AM CST -----    ----- Message -----  From: Alisa Bazan  Sent: 12/24/2020   9:54 AM CST  To: Judy Robles, [facility]. Returning your call.

## 2021-02-03 ENCOUNTER — TELEPHONE (OUTPATIENT)
Dept: FAMILY MEDICINE CLINIC | Facility: CLINIC | Age: 26
End: 2021-02-03

## 2021-02-03 NOTE — TELEPHONE ENCOUNTER
Can keep appointment though if symptoms worsen before appointment needs to get checked at local facility of urgent care/ER evaluation.   Would not recommend a walk in clinic (like in [place]) as I'd want him to go to a facility that can do imaging and pos

## 2021-02-03 NOTE — TELEPHONE ENCOUNTER
Patient c/o having a \"nagging mild pain\" in his chest.  Patient also c/o of tightness behind his L shoulder. No injury that he knows of. States the pain has been going on for a couple days now.       Patient denies having any SOB, Numbness/tingling in t

## 2021-02-03 NOTE — TELEPHONE ENCOUNTER
Patient informed of the below recommendations and expressed understanding. Patient is also going to see if he can coordinate with him mom to come in sooner for appt.

## 2021-02-04 ENCOUNTER — OFFICE VISIT (OUTPATIENT)
Dept: FAMILY MEDICINE CLINIC | Facility: CLINIC | Age: 26
End: 2021-02-04
Payer: COMMERCIAL

## 2021-02-04 VITALS
HEIGHT: 72 IN | DIASTOLIC BLOOD PRESSURE: 78 MMHG | OXYGEN SATURATION: 98 % | BODY MASS INDEX: 42.66 KG/M2 | HEART RATE: 116 BPM | WEIGHT: 315 LBS | SYSTOLIC BLOOD PRESSURE: 132 MMHG | TEMPERATURE: 99 F | RESPIRATION RATE: 16 BRPM

## 2021-02-04 DIAGNOSIS — R07.89 COSTOCHONDRAL CHEST PAIN: Primary | ICD-10-CM

## 2021-02-04 PROCEDURE — 3008F BODY MASS INDEX DOCD: CPT | Performed by: NURSE PRACTITIONER

## 2021-02-04 PROCEDURE — 3078F DIAST BP <80 MM HG: CPT | Performed by: NURSE PRACTITIONER

## 2021-02-04 PROCEDURE — 3075F SYST BP GE 130 - 139MM HG: CPT | Performed by: NURSE PRACTITIONER

## 2021-02-04 PROCEDURE — 99213 OFFICE O/P EST LOW 20 MIN: CPT | Performed by: NURSE PRACTITIONER

## 2021-02-04 RX ORDER — MELOXICAM 7.5 MG/1
7.5 TABLET ORAL DAILY
Qty: 7 TABLET | Refills: 0 | Status: SHIPPED | OUTPATIENT
Start: 2021-02-04 | End: 2021-02-11

## 2021-02-04 NOTE — PROGRESS NOTES
[facility] [place]  PROGRESS NOTE  Chief Complaint:   Patient presents with:  Chest Pain: mild/random       HPI:   This is a 23year old male coming in for chest pain. Patient has had one week of mild chest pain.   Reports symptoms are intermi Current Meds:  Current Outpatient Medications   Medication Sig Dispense Refill   • Meloxicam 7.5 MG Oral Tab Take 1 tablet (7.5 mg total) by mouth daily for 7 days.  7 tablet 0   • Venlafaxine HCl ER 37.5 MG Oral Capsule SR 24 Hr Take 1 capsule (37.5 mg tot 08/15/20 : (!) 305 lb (138.3 kg)  07/18/20 : 300 lb (136.1 kg)  02/11/20 : (!) 305 lb (138.3 kg)  01/16/20 : (!) 307 lb 12.8 oz (139.6 kg)      Vital signs reviewed. Appears stated age, well groomed.   Physical Exam:  GEN:  Patient is alert, awake and orient Patient/Caregiver Education: Patient/Caregiver Education: There are no barriers to learning. Medical education done. Outcome: Patient verbalizes understanding.  Patient is notified to call with any questions, complications, allergies, or worsening or patricio The chest pain that you have had today is caused by costochondritis. This condition is caused by an inflammation of the cartilage joining your ribs to your breastbone. It's not caused by heart or lung problems.  Your healthcare team has made sure that the c When to seek medical advice  Call your healthcare provider right away if any of these occur:  · A change in the type of pain. Call if it feels different, becomes more serious, lasts longer, or spreads into your shoulder, arm, neck, jaw, or back.   · Shortne

## 2021-02-04 NOTE — PATIENT INSTRUCTIONS
EKG with mildly elevated heart rate otherwise is normal.  Meloxicam 7.5mg once a day, take with food.   Take consistently for a week, do not take with other antiinflammatory medications (ibuprofen, advil, aleve, naproxen, aspirin); be aware of potential for · You may use acetaminophen, ibuprofen, or naproxen to control pain, unless another pain medicine was prescribed. If you have liver or kidney disease or ever had a stomach ulcer, talk with your healthcare provider before using these medicines.   · You can a

## 2021-04-09 DIAGNOSIS — Z23 NEED FOR VACCINATION: ICD-10-CM

## 2021-05-25 DIAGNOSIS — F34.1 DYSTHYMIC DISORDER: ICD-10-CM

## 2021-05-25 NOTE — TELEPHONE ENCOUNTER
Future appt:     Last Appointment with provider:   2/4/2021- advised Return if symptoms worsen or fail to improve.   Last refill: 12/23/20- venlafaxine HCI 37.5 mg and Venlafaxine  mg      Last appointment at [facility]:  2/4/2021  No results found rolling walker

## 2021-05-26 RX ORDER — VENLAFAXINE HYDROCHLORIDE 37.5 MG/1
CAPSULE, EXTENDED RELEASE ORAL
Qty: 90 CAPSULE | Refills: 0 | Status: SHIPPED | OUTPATIENT
Start: 2021-05-26 | End: 2021-08-17

## 2021-05-26 RX ORDER — VENLAFAXINE HYDROCHLORIDE 150 MG/1
CAPSULE, EXTENDED RELEASE ORAL
Qty: 90 CAPSULE | Refills: 0 | Status: SHIPPED | OUTPATIENT
Start: 2021-05-26 | End: 2021-08-17

## 2021-05-26 NOTE — TELEPHONE ENCOUNTER
Additional 90-day refill sent. Patient last seen in December, recommended seeing PCP Dr. Federico Carson in 6 months for a physical as he has not seen his primary care physician in many years.   Recommend scheduling an appointment for next month and scheduling it now

## 2021-07-23 ENCOUNTER — TELEMEDICINE (OUTPATIENT)
Dept: FAMILY MEDICINE CLINIC | Facility: CLINIC | Age: 26
End: 2021-07-23
Payer: COMMERCIAL

## 2021-07-23 VITALS — HEIGHT: 72 IN | WEIGHT: 315 LBS | BODY MASS INDEX: 42.66 KG/M2

## 2021-07-23 DIAGNOSIS — J45.20 MILD INTERMITTENT ASTHMA, UNSPECIFIED WHETHER COMPLICATED: Primary | ICD-10-CM

## 2021-07-23 PROCEDURE — 3008F BODY MASS INDEX DOCD: CPT | Performed by: NURSE PRACTITIONER

## 2021-07-23 PROCEDURE — 99213 OFFICE O/P EST LOW 20 MIN: CPT | Performed by: NURSE PRACTITIONER

## 2021-07-23 RX ORDER — ALBUTEROL SULFATE 90 UG/1
1-2 AEROSOL, METERED RESPIRATORY (INHALATION)
Qty: 1 EACH | Refills: 1 | Status: SHIPPED | OUTPATIENT
Start: 2021-07-23

## 2021-07-23 NOTE — PROGRESS NOTES
This is a telemedicine visit with live, interactive video and audio. Patient understands and accepts financial responsibility for any deductible, co-insurance and/or co-pays associated with this service. SUBJECTIVE    Started 07/19/21.   Started with ER 37.5 MG Oral Capsule SR 24 Hr TAKE 1 CAPSULE BY MOUTH DAILY.  (TAKE WITH 150MG DAILY) 90 capsule 0   • omeprazole 20 MG Oral Capsule Delayed Release Take 20 mg by mouth every morning before breakfast.     • LORazepam 0.5 MG Oral Tab Take 1 tablet (0.5 mg

## 2021-08-17 DIAGNOSIS — F34.1 DYSTHYMIC DISORDER: ICD-10-CM

## 2021-08-17 RX ORDER — VENLAFAXINE HYDROCHLORIDE 150 MG/1
CAPSULE, EXTENDED RELEASE ORAL
Qty: 90 CAPSULE | Refills: 0 | Status: SHIPPED | OUTPATIENT
Start: 2021-08-17 | End: 2022-01-04

## 2021-08-17 RX ORDER — VENLAFAXINE HYDROCHLORIDE 37.5 MG/1
CAPSULE, EXTENDED RELEASE ORAL
Qty: 90 CAPSULE | Refills: 0 | Status: SHIPPED | OUTPATIENT
Start: 2021-08-17 | End: 2022-01-04

## 2021-08-17 NOTE — TELEPHONE ENCOUNTER
[employer] Message Sent--Due for Appt    Venlafaxine x2: 5/26/21       Return in about 6 months (around 6/23/2021) for Anxiety follow up. Patient was seen for other issues within the   6 months.     Future appt:    Last Appointment with provider:    12/2020

## 2022-01-02 DIAGNOSIS — F34.1 DYSTHYMIC DISORDER: ICD-10-CM

## 2022-01-04 DIAGNOSIS — F34.1 DYSTHYMIC DISORDER: ICD-10-CM

## 2022-01-04 NOTE — TELEPHONE ENCOUNTER
Pt states that he is currently living in [place] and needs this prescription filled for 30 days so he can follow up with a new provider.      Future appt:    Last Appointment with provider:   Visit date not found  Last appointment at [facility]: 1/2/20

## 2022-01-05 RX ORDER — VENLAFAXINE HYDROCHLORIDE 150 MG/1
CAPSULE, EXTENDED RELEASE ORAL
Qty: 90 CAPSULE | Refills: 0 | OUTPATIENT
Start: 2022-01-05

## 2022-01-05 RX ORDER — VENLAFAXINE HYDROCHLORIDE 37.5 MG/1
37.5 CAPSULE, EXTENDED RELEASE ORAL DAILY
Qty: 30 CAPSULE | Refills: 0 | Status: SHIPPED | OUTPATIENT
Start: 2022-01-05

## 2022-01-05 RX ORDER — VENLAFAXINE HYDROCHLORIDE 37.5 MG/1
CAPSULE, EXTENDED RELEASE ORAL
Qty: 90 CAPSULE | Refills: 0 | OUTPATIENT
Start: 2022-01-05

## 2022-01-05 RX ORDER — VENLAFAXINE HYDROCHLORIDE 150 MG/1
150 CAPSULE, EXTENDED RELEASE ORAL DAILY
Qty: 30 CAPSULE | Refills: 0 | Status: SHIPPED | OUTPATIENT
Start: 2022-01-05

## 2022-01-31 ENCOUNTER — TRANSFERRED RECORDS (OUTPATIENT)
Dept: HEALTH INFORMATION MANAGEMENT | Facility: CLINIC | Age: 27
End: 2022-01-31

## 2022-02-04 RX ORDER — VENLAFAXINE HYDROCHLORIDE 150 MG/1
CAPSULE, EXTENDED RELEASE ORAL
Qty: 30 CAPSULE | Refills: 0 | OUTPATIENT
Start: 2022-02-04

## 2022-02-04 RX ORDER — VENLAFAXINE HYDROCHLORIDE 37.5 MG/1
CAPSULE, EXTENDED RELEASE ORAL
Qty: 30 CAPSULE | Refills: 0 | OUTPATIENT
Start: 2022-02-04

## 2022-02-04 NOTE — TELEPHONE ENCOUNTER
Spoke with patient. States [place] out [place] and has another provider that is refilling his Venlafaxine. States to \"disregard\" this request.  Asked patient if he wanted me to removed Dr. Eva Nunez as his PCP and he said \"No yet. I still need to find a new primary doctor. \"

## 2023-08-02 NOTE — TELEPHONE ENCOUNTER
Future appt:    Last Appointment:  5/23/2019  No results found for: CHOLEST, HDL, LDL, TRIGLY, TRIG  No results found for: EAG, A1C  No results found for: T4F, TSH, TSHT4    No follow-ups on file. Please proceed with the writing of the said letter.

## 2024-06-06 NOTE — TELEPHONE ENCOUNTER
LM for patient to return call to schedule an appt.
Patient returned call. Informed patient Rx was sent to requested pharmacy for 30 pills with no refills. Patient understood. See phone encounter from 1/4/2022.
Venlafaxine x2: 8/17/21    Due for Appt. [employer] Message Sent.
Patent

## (undated) DIAGNOSIS — F34.1 DYSTHYMIC DISORDER: ICD-10-CM